# Patient Record
(demographics unavailable — no encounter records)

---

## 2024-10-08 NOTE — PHYSICAL EXAM

## 2024-10-08 NOTE — DEVELOPMENTAL MILESTONES
[Dresses and undresses without help] : dresses and undresses without help [Goes to the bathroom independently] : goes to bathroom independently [Is dry through the day] :  is dry through the day [Plays and interacts with peer] : plays and interacts with peer [Answers "why" questions] : answers "why" questions [Tells a story of 2 sentences or more] : tells a story of 2 sentences or more [Follows directions for 4 individual] : follows directions for 4 individual prepositions [Counts 5 objects] : counts 5 objects [Names 3 or more numbers] : names 3 or more numbers [Names 4 or more letters out of order] : names 4 or more letters out of order [Is beginning to skip] : is beginning to skip [Walks on tiptoes when asked] : walks on tiptoes when asked [Catches a bounced ball with] : catches a bounced ball with 2 hands [Copies a triangle] : copies a triangle [Draws a 6-part person] : draws a 6-part person [Copies first name] : copies first name [Cuts well with scissors] : cuts well with scissors [Writes 2 or more letters] : writes 2 or more letters [Normal Development] : Normal Development [None] : none [Spreads with a knife] : does not spread with a knife [Yes] : Completed.

## 2024-10-08 NOTE — HISTORY OF PRESENT ILLNESS
[Mother] : mother [whole ___ oz/d] : consumes [unfilled] oz of whole cow's milk per day [Sugar drinks] : sugar drinks [Fruit] : fruit [Vegetables] : vegetables [Meat] : meat [Grains] : grains [Eggs] : eggs [Dairy] : dairy [___ stools per day] : [unfilled]  stools per day [Firm] : stools are firm consistency [___ voids per day] : [unfilled] voids per day [Toilet Trained] :  toilet trained [Normal] : Normal [In own bed] : In own bed [Brushing teeth] : Brushing teeth [Yes] : Patient goes to dentist yearly [Toothpaste] : Primary Fluoride Source: Toothpaste [Playtime (60 min/d)] : Playtime 60 min a day [< 2 hrs of screen time] : Less than 2 hrs of screen time [Appropiate parent-child-sibling interaction] : Appropriate parent-child-sibling interaction [Child Cooperates] : Child cooperates [Parent has appropriate responses to behavior] : Parent has appropriate responses to behavior [In ] : In  [Adequate performance] : Adequate performance [Adequate attention] : Adequate attention [No difficulties with Homework] : No difficulties with homework  [No] : Not at  exposure [Water heater temperature set at <120 degrees F] : Water heater temperature set at <120 degrees F [Car seat in back seat] : Car seat in back seat [Carbon Monoxide Detectors] : Carbon monoxide detectors [Smoke Detectors] : Smoke detectors [Supervised outdoor play] : Supervised outdoor play [NO] : No [Exposure to electronic nicotine delivery system] : No exposure to electronic nicotine delivery system

## 2024-11-04 NOTE — PHYSICAL EXAM
[Wheezing] : no wheezing [Rales] : rales [Crackles] : crackles [Tachypnea] : no tachypnea [Rhonchi] : no rhonchi [NL] : warm, clear

## 2024-11-04 NOTE — HISTORY OF PRESENT ILLNESS
[EENT/Resp Symptoms] : EENT/RESPIRATORY SYMPTOMS [Runny nose] : runny nose [Nasal congestion] : nasal congestion [Cough] : cough [___ Day(s)] : [unfilled] day(s) [Constant] : constant [Wet cough] : wet cough [Barking cough] : barking cough [At Night] : at night [Nasal saline] : nasal saline [OTC Cough/Cold Preparations] : OTC cough/cold preparations [Nebulizer: ___] : nebulizer: [unfilled] [Acetaminophen] : acetaminophen [Known Exposure to COVID-19] : no known exposure to COVID-19 [Hx of recent COVID-19 infection] : no history of recent COVID-19 infection [Sick Contacts: ___] : no sick contacts [de-identified] : cough and congestion  [FreeTextEntry6] : no fever no distress worsening of cough

## 2024-11-04 NOTE — DISCUSSION/SUMMARY
[FreeTextEntry1] : Start antibiotic treatments if worsens or SOB or chest pain to call back albuterol every 6 hours

## 2024-11-04 NOTE — HISTORY OF PRESENT ILLNESS
[EENT/Resp Symptoms] : EENT/RESPIRATORY SYMPTOMS [Runny nose] : runny nose [Nasal congestion] : nasal congestion [Cough] : cough [___ Day(s)] : [unfilled] day(s) [Constant] : constant [Wet cough] : wet cough [Barking cough] : barking cough [At Night] : at night [Nasal saline] : nasal saline [OTC Cough/Cold Preparations] : OTC cough/cold preparations [Nebulizer: ___] : nebulizer: [unfilled] [Acetaminophen] : acetaminophen [Known Exposure to COVID-19] : no known exposure to COVID-19 [Hx of recent COVID-19 infection] : no history of recent COVID-19 infection [Sick Contacts: ___] : no sick contacts [de-identified] : cough and congestion  [FreeTextEntry6] : no fever no distress worsening of cough

## 2024-11-13 NOTE — PHYSICAL EXAM
[Wheezing] : wheezing [Crackles] : crackles [Subcostal Retractions] : no subcostal retractions [Suprasternal Retractions] : no suprasternal retractions [NL] : warm, clear

## 2024-11-13 NOTE — HISTORY OF PRESENT ILLNESS
[EENT/Resp Symptoms] : EENT/RESPIRATORY SYMPTOMS [Runny nose] : runny nose [___ Day(s)] : [unfilled] day(s) [Intermittent] : intermittent [Change in sleep pattern] : change in sleep pattern [Known Exposure to COVID-19] : no known exposure to COVID-19 [Hx of recent COVID-19 infection] : no history of recent COVID-19 infection [Sick Contacts: ___] : no sick contacts [Wet cough] : wet cough [At Night] : at night [In Morning] : in morning [Rhinorrhea] : rhinorrhea [Nasal Congestion] : nasal congestion [Cough] : cough

## 2025-01-18 NOTE — HISTORY OF PRESENT ILLNESS
[EENT/Resp Symptoms] : EENT/RESPIRATORY SYMPTOMS [Runny nose] : runny nose [Nasal congestion] : nasal congestion [Fatigued] : fatigued [Decreased appetite] : decreased appetite [Known Exposure to COVID-19] : no known exposure to COVID-19 [Hx of recent COVID-19 infection] : no history of recent COVID-19 infection [Sick Contacts: ___] : no sick contacts [Wet cough] : wet cough [Fever] : fever [Nasal Congestion] : nasal congestion [Cough] : cough [Decreased Appetite] : decreased appetite [de-identified] : Pt being coughing since last weekend; the fever went way Thursday and then had it again last night.

## 2025-01-18 NOTE — HISTORY OF PRESENT ILLNESS
[EENT/Resp Symptoms] : EENT/RESPIRATORY SYMPTOMS [Runny nose] : runny nose [Nasal congestion] : nasal congestion [Fatigued] : fatigued [Decreased appetite] : decreased appetite [Known Exposure to COVID-19] : no known exposure to COVID-19 [Hx of recent COVID-19 infection] : no history of recent COVID-19 infection [Sick Contacts: ___] : no sick contacts [Wet cough] : wet cough [Fever] : fever [Nasal Congestion] : nasal congestion [Cough] : cough [Decreased Appetite] : decreased appetite [de-identified] : Pt being coughing since last weekend; the fever went way Thursday and then had it again last night.

## 2025-03-21 NOTE — HISTORY OF PRESENT ILLNESS
[Fever] : FEVER [___ Day(s)] : [unfilled] day(s) [Constant] : constant [Active] : active [At Night] : at night [Acetaminophen] : acetaminophen [Ibuprofen] : ibuprofen [Runny Nose] : runny nose [Nasal Congestion] : nasal congestion [Sore Throat] : sore throat [Cough] : cough [Diarrhea] : diarrhea [Max Temp: ____] : Max temperature: [unfilled] [Stable] : stable [Known Exposure to COVID-19] : no known exposure to COVID-19 [Hx of recent COVID-19 infection] : no history of recent COVID-19 infection [Sick Contacts: ___] : no sick contacts [Headache] : no headache [Eye Redness] : no eye redness [Eye Discharge] : no eye discharge [Ear Pain] : no ear pain [Wheezing] : no wheezing [Decreased Appetite] : no decreased appetite [Vomiting] : no vomiting [Decreased Urine Output] : no decreased urine output [Dysuria] : no dysuria [Rash] : no rash [Loss of taste] : no loss of taste [Loss of smell] : no loss of smell [de-identified] : mother ran flu test at home flu B on Sunday